# Patient Record
Sex: FEMALE | Race: WHITE | NOT HISPANIC OR LATINO | Employment: FULL TIME | URBAN - METROPOLITAN AREA
[De-identification: names, ages, dates, MRNs, and addresses within clinical notes are randomized per-mention and may not be internally consistent; named-entity substitution may affect disease eponyms.]

---

## 2017-01-18 ENCOUNTER — OFFICE VISIT (OUTPATIENT)
Dept: BARIATRICS/WEIGHT MGMT | Facility: CLINIC | Age: 37
End: 2017-01-18

## 2017-01-18 VITALS
WEIGHT: 224 LBS | RESPIRATION RATE: 16 BRPM | DIASTOLIC BLOOD PRESSURE: 71 MMHG | SYSTOLIC BLOOD PRESSURE: 130 MMHG | BODY MASS INDEX: 33.95 KG/M2 | TEMPERATURE: 98.7 F | HEART RATE: 69 BPM | HEIGHT: 68 IN

## 2017-01-18 DIAGNOSIS — Z72.4 INAPPROPRIATE DIET AND EATING HABITS: ICD-10-CM

## 2017-01-18 DIAGNOSIS — R63.5 UNINTENDED WEIGHT GAIN: ICD-10-CM

## 2017-01-18 DIAGNOSIS — R63.2 POLYPHAGIA(783.6): ICD-10-CM

## 2017-01-18 DIAGNOSIS — E66.9 OBESITY (BMI 30-39.9): Primary | ICD-10-CM

## 2017-01-18 DIAGNOSIS — Z71.3 DIETARY COUNSELING: ICD-10-CM

## 2017-01-18 PROCEDURE — S2083 ADJUSTMENT GASTRIC BAND: HCPCS | Performed by: NURSE PRACTITIONER

## 2017-01-18 NOTE — MR AVS SNAPSHOT
Rosa Avila   1/18/2017 4:30 PM   Office Visit    Dept Phone:  [phone]   Encounter #:  [id number]    Provider:  AFUA Manrique   Department:  [facility]                Your Full Care Plan              Your Updated Medication List          This list is accurate as of: 1/18/17  4:58 PM.  Always use your most recent med list.                buPROPion  MG 12 hr tablet   Commonly known as:  WELLBUTRIN SR       traZODone 50 MG tablet   Commonly known as:  DESYREL               You Were Diagnosed With        Codes Comments    Obesity (BMI 30-39.9)    -  Primary ICD-10-CM: E66.9  ICD-9-CM: 278.00     Dietary counseling     ICD-10-CM: Z71.3  ICD-9-CM: V65.3     Polyphagia     ICD-10-CM: R63.2  ICD-9-CM: 783.6     Unintended weight gain     ICD-10-CM: R63.5  ICD-9-CM: 783.1     Inappropriate diet and eating habits     ICD-10-CM: Z72.4  ICD-9-CM: V69.1       Instructions    Patient to return to clinic for nausea, vomiting, dysphagia, regurgitation or heartburn/reflux. Reviewed tight band symptoms with patient. Encouraged them to increase their lean protein and decrease their carbohydrates. Be sure to drink plenty of water daily and eliminate any liquid calories. Recommended 150 minutes of exercise per week including both cardio and strength training.        Patient Instructions History      Upcoming Appointments     Visit Type Date Time Department    FOLLOW UP 1/18/2017  4:30 PM [facility]    FOLLOW UP 3/31/2017  3:00 PM [facility]      [employer] Signup     [facility] [employer] allows you to send messages to your doctor, view your test results, renew your prescriptions, schedule appointments, and more. To sign up, go to News in [employer]orts and click on the Sign Up Now link in the New User? box. Enter your [employer] Activation Code exactly as it appears below along with the last four digits of your Social Security Number and your  "Date of Birth () to complete the sign-up process. If you do not sign up before the expiration date, you must request a new code.    [employer] Activation Code: [email or web address]  Expires: 2017  4:58 PM    If you have questions, you can email [email or web address] or call [phone] to talk to our [employer] staff. Remember, [employer] is NOT to be used for urgent needs. For medical emergencies, dial 911.               Other Info from Your Visit           Your Appointments     Mar 31, 2017  3:00 PM EDT   Follow Up with AFUA Manrique   [facility] BARIATRIC SURGERY (--)    [address] [place] [address]. [address]  [facility] [address] [phone]           Arrive 15 minutes prior to appointment.              Allergies     Hydrocodone-acetaminophen      Tramadol        Reason for Visit     Follow-up s/p lapband      Vital Signs     Blood Pressure Pulse Temperature Respirations Height Weight    130/71 69 98.7 °F (37.1 °C) (Oral) 16 68\" (172.7 cm) 224 lb (102 kg)    Body Mass Index Smoking Status                34.06 kg/m2 Former Smoker          Problems and Diagnoses Noted     Dietary counseling    Inappropriate diet and eating habits    Obesity (BMI 30-39.9)    Excessive eating    Unintended weight gain      No Longer an Issue     Overweight    Regurgitation        "

## 2017-01-18 NOTE — PROGRESS NOTES
[facility] BARIATRIC SURGERY  [address]  [facility] [address]  [address]  [facility] [address]  Dept: [phone]  1/18/2017      Rosa Avila.  [id number]  [id number]  1980  female      Chief Complaint   Patient presents with   • Follow-up     s/p lapband       BH Post-Op Bariatric Surgery:   Roas Avila is status post Lapband procedure (APS), performed on 12/17/10.     HPI:   Today's weight is 224 lb (102 kg)  pounds, today's BMI is Body mass index is 34.06 kg/(m^2). and she has a gain of 55 pounds since the last visit. The patient reports a portion size larger than the small plate, increased hunger and denies a loss of appetite.     Rosa Avila denies nausea, dysphagia, or abdominal pain. The patientdoes not have vomitng. The patientdoes not have reflux.    Diet and Exercise: Diet history reviewed and discussed with the patient. Weight loss/gains to date discussed with the patient. She reports eating 3 meals per day, a typical portion size of greater than 1 cup, eating 2 snack per day, drinking 2 8-oz. glasses of water per day. The patient can tolerate solid protein.   The patient is not eating protein first. The patient is not limiting food volume. The patient is not taking vitamins. The patient is limiting snacking. The patient is exercising regularly- walking. She is drinking carbonated beverages- regular soda.     The following portions of the patient's history were reviewed and updated as appropriate: allergies, current medications, past family history, past medical history, past social history, past surgical history and problem list.    Vitals:    01/18/17 1616   BP: 130/71   Pulse: 69   Resp: 16   Temp: 98.7 °F (37.1 °C)       Review of Systems   Endocrine: Positive for polyphagia.   All other systems reviewed and are negative.      Physical Exam   Constitutional: She is oriented to person, place, and time. She appears  well-developed and well-nourished.   HENT:   Head: Normocephalic and atraumatic.   Eyes: EOM are normal.   Cardiovascular: Normal rate.    Pulmonary/Chest: Effort normal.   Abdominal: Soft.   Musculoskeletal: Normal range of motion.   Neurological: She is alert and oriented to person, place, and time.   Skin: Skin is warm and dry.   Psychiatric: She has a normal mood and affect. Her behavior is normal. Judgment and thought content normal.   Vitals reviewed.      Assessment: Post-operatively the patient has regressed    Plan:     I think she would benefit from additional band restriction.  Under aseptic conditions, I accessed the port and 1.4mls of fluid were added for a total of 5.6mls.  She was able to tolerate a glass of water at the conclusion of the fill.  She was advised to consume soft solids for 24 hours before advancing back to a regular diet.  RTC for n/v/d/regurg.     We discussed her protein sources and that eating dense solid protein along with plenty of vegetables and fresh fruits is important for weight loss. Reviewed appropriate water intake- half of body weight in ounces and exercise routine- minimum of 150 minutes per with including both cardio and strength training. Instructed not to drink with meals and wait 45 minutes after each meal before drinking.    She should follow-up in 6-8 weeks       Activity restrictions: None.   Instructions / Recommendations: dietary counseling recommended, recommended a daily protein intake of  grams, patient was advised that the lap band system works best when consuming solid foods, vitamin supplement(s) recommended, recommended exercising at least 150 minutes per week, behavior modifications recommended and instructed to call the office for concerns, questions, or problems.     The patient was counseled regarding. Total time spent face to face was 10 minutes and more than half the time was spent counseling.

## 2017-01-18 NOTE — PATIENT INSTRUCTIONS
Patient to return to clinic for nausea, vomiting, dysphagia, regurgitation or heartburn/reflux. Reviewed tight band symptoms with patient. Encouraged them to increase their lean protein and decrease their carbohydrates. Be sure to drink plenty of water daily and eliminate any liquid calories. Recommended 150 minutes of exercise per week including both cardio and strength training.

## 2017-04-05 ENCOUNTER — OFFICE VISIT (OUTPATIENT)
Dept: BARIATRICS/WEIGHT MGMT | Facility: CLINIC | Age: 37
End: 2017-04-05

## 2017-04-05 VITALS
RESPIRATION RATE: 16 BRPM | DIASTOLIC BLOOD PRESSURE: 79 MMHG | HEART RATE: 61 BPM | HEIGHT: 68 IN | WEIGHT: 218 LBS | TEMPERATURE: 98.5 F | SYSTOLIC BLOOD PRESSURE: 135 MMHG | BODY MASS INDEX: 33.04 KG/M2

## 2017-04-05 DIAGNOSIS — R63.2 POLYPHAGIA(783.6): ICD-10-CM

## 2017-04-05 DIAGNOSIS — Z71.3 DIETARY COUNSELING: ICD-10-CM

## 2017-04-05 DIAGNOSIS — I10 BENIGN ESSENTIAL HYPERTENSION: ICD-10-CM

## 2017-04-05 DIAGNOSIS — Z72.4 INAPPROPRIATE DIET AND EATING HABITS: ICD-10-CM

## 2017-04-05 DIAGNOSIS — E66.9 OBESITY (BMI 30-39.9): Primary | ICD-10-CM

## 2017-04-05 DIAGNOSIS — R53.83 FATIGUE, UNSPECIFIED TYPE: ICD-10-CM

## 2017-04-05 PROBLEM — R63.5 UNINTENDED WEIGHT GAIN: Status: RESOLVED | Noted: 2017-01-18 | Resolved: 2017-04-05

## 2017-04-05 PROCEDURE — S2083 ADJUSTMENT GASTRIC BAND: HCPCS | Performed by: NURSE PRACTITIONER

## 2017-04-05 NOTE — PROGRESS NOTES
[facility] BARIATRIC SURGERY  [address]  [facility] [id number] [phone]  [address]  [facility] [address] [phone]  Dept: [phone]  4/5/2017      Rosa Avila.  [id number]  [id number]  1980  female      Chief Complaint   Patient presents with   • Follow-up     s/p lapband       BH Post-Op Bariatric Surgery:   Rosa Avila is status post Lapband procedure (APS), performed on 12/17/10.     HPI:   Today's weight is 218 lb (98.9 kg)  pounds, today's BMI is Body mass index is 33.15 kg/(m^2). and she has a loss of 6 pounds since the last visit. The patient reports a portion size larger than the small plate, increased hunger and denies a loss of appetite.     Rosa Avila denies nausea, dysphagia, or abdominal pain. The patientdoes not have vomitng. The patientdoes not have reflux.    Diet and Exercise: Diet history reviewed and discussed with the patient. Weight loss/gains to date discussed with the patient. She reports eating 3 meals per day, a typical portion size of greater than 1 cup, eating 2 snack per day, drinking 3 8-oz. glasses of water per day. The patient can tolerate solid protein.   The patient is eating protein first. The patient is limiting food volume. The patient is not taking vitamins. The patient is limiting snacking. The patient is exercising regularly- walking three times a week. She is drinking carbonated beverages- regular soda but cutting back.     The following portions of the patient's history were reviewed and updated as appropriate: allergies, current medications, past family history, past medical history, past social history, past surgical history and problem list.    Vitals:    04/05/17 1019   BP: 135/79   Pulse: 61   Resp: 16   Temp: 98.5 °F (36.9 °C)       Review of Systems   Endocrine: Positive for polyphagia.   All other systems reviewed and are negative.      Physical Exam   Constitutional:  She is oriented to person, place, and time. She appears well-developed and well-nourished.   HENT:   Head: Normocephalic and atraumatic.   Eyes: EOM are normal.   Cardiovascular: Normal rate.    Pulmonary/Chest: Effort normal.   Abdominal: Soft.   Musculoskeletal: Normal range of motion.   Neurological: She is alert and oriented to person, place, and time.   Skin: Skin is warm and dry.   Psychiatric: She has a normal mood and affect. Her behavior is normal. Judgment and thought content normal.   Vitals reviewed.      Assessment: Post-operatively the patient is doing better but would like an adjustment    Plan:     I think she would benefit from additional band restriction.  Under aseptic conditions, I accessed the port and 0.8mls of fluid were added for a total of 6.4mls.  She was able to tolerate a glass of water at the conclusion of the fill.  She was advised to consume soft solids for 24 hours before advancing back to a regular diet.  RTC for n/v/d/regurg.     We discussed her protein sources and that eating dense solid protein along with plenty of vegetables and fresh fruits is important for weight loss. Reviewed appropriate water intake- half of body weight in ounces and exercise routine- minimum of 150 minutes per with including both cardio and strength training. Instructed not to drink with meals and wait 45 minutes after each meal before drinking.    She should follow-up in 6 weeks       Activity restrictions: None.   Instructions / Recommendations: dietary counseling recommended, recommended a daily protein intake of  grams, patient was advised that the lap band system works best when consuming solid foods, vitamin supplement(s) recommended, recommended exercising at least 150 minutes per week, behavior modifications recommended and instructed to call the office for concerns, questions, or problems.     The patient was counseled regarding. Total time spent face to face was 12 minutes and more than  half the time was spent counseling.

## 2017-04-06 ENCOUNTER — OFFICE VISIT (OUTPATIENT)
Dept: BARIATRICS/WEIGHT MGMT | Facility: CLINIC | Age: 37
End: 2017-04-06

## 2017-04-06 VITALS
SYSTOLIC BLOOD PRESSURE: 120 MMHG | HEIGHT: 68 IN | DIASTOLIC BLOOD PRESSURE: 82 MMHG | RESPIRATION RATE: 16 BRPM | WEIGHT: 213 LBS | BODY MASS INDEX: 32.28 KG/M2

## 2017-04-06 DIAGNOSIS — I10 BENIGN ESSENTIAL HYPERTENSION: ICD-10-CM

## 2017-04-06 DIAGNOSIS — E66.9 OBESITY (BMI 30-39.9): Primary | ICD-10-CM

## 2017-04-06 DIAGNOSIS — R13.10 DYSPHAGIA, UNSPECIFIED TYPE: ICD-10-CM

## 2017-04-06 DIAGNOSIS — IMO0001 REGURGITATION: ICD-10-CM

## 2017-04-06 DIAGNOSIS — Z71.3 DIETARY COUNSELING: ICD-10-CM

## 2017-04-06 PROCEDURE — S2083 ADJUSTMENT GASTRIC BAND: HCPCS | Performed by: NURSE PRACTITIONER

## 2017-04-06 NOTE — PROGRESS NOTES
[facility] BARIATRIC SURGERY  [address]  [facility] [id number] [phone]  [address]  [facility] [address] [phone]  Dept: [phone]  4/6/2017      Rosa Avila.  [id number]  [id number]  1980  female      Chief Complaint   Patient presents with   • Follow-up     s/p lapband c/o being too tight       BH Post-Op Bariatric Surgery:   Rosa Avila is status post Lapband procedure (APS), performed on 12/17/10.     HPI:   Today's weight is 213 lb (96.6 kg)  pounds, today's BMI is Body mass index is 32.39 kg/(m^2). and she has a loss of 5 pounds since the last visit. The patient reports decreased hunger and  loss of appetite.     Patient admits to nausea and dysphagia. The patient denies abdominal pain. The patientdoes not have vomitng. The patientdoes not have reflux.    Diet and Exercise: Diet history reviewed and discussed with the patient. Weight loss/gains to date discussed with the patient. The patient cannot tolerate solid protein.   The patient is not eating protein first. The patient is limiting food volume.  The patient is exercising regularly. She is not drinking carbonated beverages.     The following portions of the patient's history were reviewed and updated as appropriate: allergies, current medications, past family history, past medical history, past social history, past surgical history and problem list.    Vitals:    04/06/17 1400   BP: 120/82   Resp: 16       Review of Systems   Gastrointestinal: Positive for nausea and vomiting.   All other systems reviewed and are negative.      Physical Exam   Constitutional: She is oriented to person, place, and time. She appears well-developed and well-nourished.   HENT:   Head: Normocephalic and atraumatic.   Eyes: EOM are normal.   Cardiovascular: Normal rate.    Pulmonary/Chest: Effort normal.   Abdominal: Soft.   Musculoskeletal: Normal range of motion.   Neurological:  She is alert and oriented to person, place, and time.   Skin: Skin is warm and dry.   Psychiatric: She has a normal mood and affect. Her behavior is normal. Judgment and thought content normal.   Vitals reviewed.      Assessment: Post-operatively the patient is too tight and needs fluid removed    Plan:    My impression is Rosa Avila has too much restriction of her band.  I think she would benefit from fluid removal from her band.  Under aseptic conditions, I accessed the port and removed 0.4cc to bring the total band volume to 6cc.  She was able to tolerate a glass of water at the conclusion of the fill.  She was advised to consume warm liquids for today and then soft solids for 24 hours before advancing back to a regular diet.   RTC for n/v/d/regurg.     Reviewed the importance of being able to tolerate dense/solid protein and vegetables for weight loss. Discussed eating foods that are easier to tolerate, softer foods, usually contribute to weight gain because they are higher calorie/carb and will not keep patient full for the recommended 3-4 hours.      She should follow-up in 6 weeks.      Activity restrictions: None.   Instructions / Recommendations: dietary counseling recommended, recommended a daily protein intake of  grams, patient was advised that the lap band system works best when consuming solid foods, vitamin supplement(s) recommended, recommended exercising at least 150 minutes per week, behavior modifications recommended and instructed to call the office for concerns, questions, or problems.     The patient was counseled regarding. Total time of spent face to face was 10 minutes and more than half the time was spent counseling.

## 2018-01-11 ENCOUNTER — OFFICE VISIT (OUTPATIENT)
Dept: BARIATRICS/WEIGHT MGMT | Facility: CLINIC | Age: 38
End: 2018-01-11

## 2018-01-11 VITALS
HEART RATE: 65 BPM | BODY MASS INDEX: 31.07 KG/M2 | TEMPERATURE: 97.4 F | DIASTOLIC BLOOD PRESSURE: 88 MMHG | HEIGHT: 68 IN | SYSTOLIC BLOOD PRESSURE: 155 MMHG | RESPIRATION RATE: 16 BRPM | WEIGHT: 205 LBS

## 2018-01-11 DIAGNOSIS — R63.2 POLYPHAGIA(783.6): ICD-10-CM

## 2018-01-11 DIAGNOSIS — I10 BENIGN ESSENTIAL HYPERTENSION: ICD-10-CM

## 2018-01-11 DIAGNOSIS — Z72.4 INAPPROPRIATE DIET AND EATING HABITS: ICD-10-CM

## 2018-01-11 DIAGNOSIS — E66.9 OBESITY (BMI 30-39.9): Primary | ICD-10-CM

## 2018-01-11 DIAGNOSIS — Z71.3 DIETARY COUNSELING: ICD-10-CM

## 2018-01-11 PROBLEM — R13.10 DYSPHAGIA: Status: RESOLVED | Noted: 2017-04-06 | Resolved: 2018-01-11

## 2018-01-11 PROCEDURE — S2083 ADJUSTMENT GASTRIC BAND: HCPCS | Performed by: NURSE PRACTITIONER

## 2018-01-11 NOTE — PROGRESS NOTES
[facility] BARIATRIC SURGERY  [address]  [facility] [address] [phone]  [address]  [facility] [address] [phone]  Dept: [phone]  1/11/2018      Rosa Avila.  [id number]  [id number]  1980  female      Chief Complaint   Patient presents with   • Follow-up     Followup AGB (6 mL)       BH Post-Op Bariatric Surgery:   Rosa Avila is status post Lapband procedure (APS), performed on 12/17/10.     HPI:   Today's weight is 93 kg (205 lb)  pounds, today's BMI is Body mass index is 31.18 kg/(m^2). and she has a loss of 8 pounds since the last visit. The patient reports a portion size larger than the small plate, increased hunger and denies a loss of appetite.     Rosa Avila denies nausea, dysphagia, or abdominal pain. The patientdoes not have vomitng. The patientdoes not have reflux.    Diet and Exercise: Diet history reviewed and discussed with the patient. Weight loss/gains to date discussed with the patient. She reports eating 4 meals per day, a typical portion size of greater than 1 cup, eating 1 snack per day, drinking 2 8-oz. glasses of water per day. The patient can tolerate solid protein.   The patient is eating protein first. The patient is limiting food volume. The patient is not taking vitamins. The patient is limiting snacking. The patient is exercising regularly- walking a couple days per week. She is drinking carbonated beverages.     The following portions of the patient's history were reviewed and updated as appropriate: allergies, current medications, past family history, past medical history, past social history, past surgical history and problem list.    Vitals:    01/11/18 1446   BP: 155/88   Pulse: 65   Resp: 16   Temp: 97.4 °F (36.3 °C)       Review of Systems   Endocrine: Positive for polyphagia.   All other systems reviewed and are negative.      Physical Exam   Constitutional: She is oriented to  person, place, and time. She appears well-developed and well-nourished.   HENT:   Head: Normocephalic and atraumatic.   Eyes: EOM are normal.   Cardiovascular: Normal rate.    Pulmonary/Chest: Effort normal.   Abdominal: Soft.   Musculoskeletal: Normal range of motion.   Neurological: She is alert and oriented to person, place, and time.   Skin: Skin is warm and dry.   Psychiatric: She has a normal mood and affect. Her behavior is normal. Judgment and thought content normal.   Vitals reviewed.        Assessment: Post-operatively the patient is doing well but would like an adjustment    Encounter Diagnoses   Name Primary?   • Obesity (BMI 30-39.9) Yes   • Polyphagia(783.6)    • Dietary counseling    • Benign essential hypertension    • Inappropriate diet and eating habits        Plan:     I think she would benefit from additional band restriction.  Under aseptic conditions, I accessed the port and 0.3mls of fluid were added for a total of 6.3mls.  She was able to tolerate a glass of water at the conclusion of the fill.  She was advised to consume soft solids for 24 hours before advancing back to a regular diet.  RTC for n/v/d/regurg.     We discussed her protein sources and that eating dense solid protein along with plenty of vegetables and fresh fruits is important for weight loss. Reviewed appropriate water intake- half of body weight in ounces and exercise routine- minimum of 150 minutes per with including both cardio and strength training. Instructed not to drink with meals and wait 45 minutes after each meal before drinking.    She should follow-up in 6-8 weeks       Activity restrictions: None.   Instructions / Recommendations: dietary counseling recommended, recommended a daily protein intake of  grams, patient was advised that the lap band system works best when consuming solid foods, vitamin supplement(s) recommended, recommended exercising at least 150 minutes per week, behavior modifications  recommended and instructed to call the office for concerns, questions, or problems.

## 2019-11-18 ENCOUNTER — OFFICE VISIT CONVERTED (OUTPATIENT)
Dept: SURGERY | Facility: CLINIC | Age: 39
End: 2019-11-18
Attending: SURGERY

## 2019-11-27 ENCOUNTER — HOSPITAL ENCOUNTER (OUTPATIENT)
Dept: ULTRASOUND IMAGING | Facility: HOSPITAL | Age: 39
Discharge: HOME | End: 2019-11-27
Attending: SURGERY

## 2019-12-09 ENCOUNTER — CONVERSION ENCOUNTER (OUTPATIENT)
Dept: SURGERY | Facility: CLINIC | Age: 39
End: 2019-12-09

## 2019-12-09 ENCOUNTER — OFFICE VISIT CONVERTED (OUTPATIENT)
Dept: SURGERY | Facility: CLINIC | Age: 39
End: 2019-12-09
Attending: SURGERY

## 2019-12-27 ENCOUNTER — HOSPITAL ENCOUNTER (OUTPATIENT)
Dept: GASTROENTEROLOGY | Facility: HOSPITAL | Age: 39
Setting detail: HOSPITAL OUTPATIENT SURGERY
Discharge: HOME | End: 2019-12-27
Attending: SURGERY

## 2021-05-03 ENCOUNTER — HOSPITAL ENCOUNTER (OUTPATIENT)
Dept: MAMMOGRAPHY | Facility: HOSPITAL | Age: 41
Discharge: HOME | End: 2021-05-03
Attending: INTERNAL MEDICINE

## 2021-05-15 VITALS — HEIGHT: 67 IN | RESPIRATION RATE: 14 BRPM | BODY MASS INDEX: 28.25 KG/M2 | WEIGHT: 180 LBS

## 2021-05-15 VITALS — HEIGHT: 67 IN | BODY MASS INDEX: 28.25 KG/M2 | WEIGHT: 180 LBS | RESPIRATION RATE: 14 BRPM

## 2021-07-21 ENCOUNTER — OFFICE VISIT (OUTPATIENT)
Dept: BARIATRICS/WEIGHT MGMT | Facility: CLINIC | Age: 41
End: 2021-07-21

## 2021-07-21 VITALS
WEIGHT: 200 LBS | SYSTOLIC BLOOD PRESSURE: 132 MMHG | BODY MASS INDEX: 31.39 KG/M2 | HEART RATE: 63 BPM | DIASTOLIC BLOOD PRESSURE: 82 MMHG | RESPIRATION RATE: 18 BRPM | HEIGHT: 67 IN | TEMPERATURE: 98 F

## 2021-07-21 DIAGNOSIS — R63.2 POLYPHAGIA(783.6): ICD-10-CM

## 2021-07-21 DIAGNOSIS — E66.9 OBESITY, CLASS I, BMI 30-34.9: Primary | ICD-10-CM

## 2021-07-21 DIAGNOSIS — Z98.84 H/O LAPAROSCOPIC ADJUSTABLE GASTRIC BANDING: ICD-10-CM

## 2021-07-21 PROBLEM — K21.9 ESOPHAGEAL REFLUX: Status: ACTIVE | Noted: 2021-07-21

## 2021-07-21 PROBLEM — E66.811 OBESITY, CLASS I, BMI 30-34.9: Status: ACTIVE | Noted: 2021-07-21

## 2021-07-21 PROCEDURE — 99203 OFFICE O/P NEW LOW 30 MIN: CPT | Performed by: NURSE PRACTITIONER

## 2021-07-21 RX ORDER — GABAPENTIN 600 MG/1
1200 TABLET ORAL
COMMUNITY
Start: 2021-07-16

## 2021-07-21 NOTE — PROGRESS NOTES
[facility] BARIATRIC SURGERY  [address] [address]  [phone]  [address] [address] [phone]  Dept: [phone]  7/21/2021      Rosa Avila.  [id number]  [id number]  1980  female      Chief Complaint   Patient presents with   • Follow-up     BAND FOLLOW UP       BH Post-Op Bariatric Surgery:   Rosa Avila is status post Lapband procedure APS, performed on 12/17/10.     HPI:   Today's weight is 90.7 kg (200 lb)  pounds, today's BMI is Body mass index is 31.32 kg/m². and has a loss of 5 pounds since the last visit. The patient reports a portion size larger than the small plate, increased hunger and denies a loss of appetite.     Rosa Avila denies nausea, dysphagia, or abdominal pain. The patientdoes not have vomitng. The patientdoes not have reflux.    Diet and Exercise: Diet history reviewed and discussed with the patient. Weight loss/gains to date discussed with the patient. She reports eating 2 meals per day, a typical portion size of greater than 1 cup, eating 2 snack per day, drinking 2 8-oz. glasses of water per day. The patient can tolerate solid protein.   The patient is eating protein first. The patient is limiting food volume. The patient is taking vitamins. The patient is limiting snacking. The patient is not exercising regularly. She is drinking carbonated beverages.     The following portions of the patient's history were reviewed and updated as appropriate: allergies, current medications, past family history, past medical history, past social history, past surgical history and problem list.    Vitals:    07/21/21 1438   BP: 132/82   Pulse: 63   Resp: 18   Temp: 98 °F (36.7 °C)       Review of Systems   Endocrine: Positive for polyphagia.   All other systems reviewed and are negative.      Physical Exam  Vitals reviewed.   Constitutional:       Appearance: Normal appearance. She is  well-developed. She is obese.   HENT:      Head: Normocephalic and atraumatic.   Cardiovascular:      Rate and Rhythm: Normal rate.   Pulmonary:      Effort: Pulmonary effort is normal.   Abdominal:      Palpations: Abdomen is soft.   Musculoskeletal:         General: Normal range of motion.   Skin:     General: Skin is warm and dry.   Neurological:      Mental Status: She is alert and oriented to person, place, and time.   Psychiatric:         Behavior: Behavior normal.         Thought Content: Thought content normal.         Judgment: Judgment normal.         Assessment: Post-operatively the patient would benefit from additional restriction    Encounter Diagnoses   Name Primary?   • Obesity, Class I, BMI 30-34.9 Yes   • H/O laparoscopic adjustable gastric banding    • Polyphagia(783.6)        Plan:     I think she would benefit from additional band restriction.  Under aseptic conditions, I accessed the port and 0.3mls of fluid were added for a total of 6.6mls.  She was able to tolerate a glass of water at the conclusion of the fill.  She was advised to consume soft solids for 24 hours before advancing back to a regular diet.  RTC for n/v/d/regurg.     We discussed her protein sources and that eating dense solid protein along with plenty of vegetables and fresh fruits is important for weight loss. Reviewed appropriate water intake- half of body weight in ounces and exercise routine- minimum of 150 minutes per with including both cardio and strength training. Instructed not to drink with meals and wait 45 minutes after each meal before drinking.    She should follow-up in 6 weeks if needed     Activity restrictions: None.   Instructions / Recommendations: dietary counseling recommended, recommended a daily protein intake of  grams, patient was advised that the lap band system works best when consuming solid foods, vitamin supplement(s) recommended, recommended exercising at least 150 minutes per week, behavior  modifications recommended and instructed to call the office for concerns, questions, or problems.    Chart was also reviewed prior to starting the visit. The patient was counseled regarding the LAP-BAND and how the band works.

## 2022-11-23 ENCOUNTER — OFFICE VISIT (OUTPATIENT)
Dept: OBSTETRICS AND GYNECOLOGY | Facility: CLINIC | Age: 42
End: 2022-11-23

## 2022-11-23 VITALS
DIASTOLIC BLOOD PRESSURE: 81 MMHG | SYSTOLIC BLOOD PRESSURE: 137 MMHG | BODY MASS INDEX: 30.92 KG/M2 | HEART RATE: 63 BPM | HEIGHT: 67 IN | WEIGHT: 197 LBS

## 2022-11-23 DIAGNOSIS — Z01.419 ENCOUNTER FOR GYNECOLOGICAL EXAMINATION WITHOUT ABNORMAL FINDING: Primary | ICD-10-CM

## 2022-11-23 PROCEDURE — 99396 PREV VISIT EST AGE 40-64: CPT | Performed by: OBSTETRICS & GYNECOLOGY

## 2022-11-23 NOTE — PROGRESS NOTES
"Well Woman Visit    CC: Annual well woman exam       HPI:   42 y.o. Contraception or HRT: using no HRT, s/p HYST, still has one or both ovaries, benign indications, rso   Menses:  none  Pain:  occ left ovary pain with ovulation, tolerable   Incontinence concerns: No  Hx of abnormal pap:  No  Pt has no complaints today.      History: PMHx, Meds, Allergies, PSHx, Social Hx, and POBHx all reviewed and updated.      PHYSICAL EXAM:  /81   Pulse 63   Ht 170.2 cm (67.01\")   Wt 89.4 kg (197 lb)   BMI 30.85 kg/m²   General- NAD, alert and oriented, appropriate  Psych- Normal mood, good memory  Neck- No masses, no thyroid enlargement  CV- Regular rhythm, no murnurs  Resp- CTA to bases, no wheezes  Abdomen- Soft, non distended, non tender, no masses    Breast left-  Bilaterally symmetrical, no masses, non tender, no nipple discharge  Breast right- Bilaterally symmetrical, no masses, non tender, no nipple discharge    External genitalia- Normal female, no lesions  Urethra/meatus- Normal, no masses, non tender, no prolapse  Bladder- Normal, no masses, non tender, no prolapse  Vagina- Normal, no atrophy, no lesions, no discharge, no prolapse  Cvx- Absent  Uterus- Absent  Adnexa- No mass, non tender  Anus/Rectum/Perineum- Not performed    Lymphatic- No palpable neck, axillary, or groin nodes  Ext- No edema, no cyanosis    Skin- No lesions, no rashes, no acanthosis nigricans        ASSESSMENT and PLAN:  WWE    Diagnoses and all orders for this visit:    1. Encounter for gynecological examination without abnormal finding (Primary)  -     Mammo Screening Digital Tomosynthesis Bilateral With CAD; Future        Domestic violence/abuse screen: negative    Depression screen: no SI    Preventative:   BREAST HEALTH- Monthly self breast exam importance and how to reviewed. MMG and/or MRI (prn) reviewed per society guidelines and her individual history. Mammo/MRI screen: Updated today.  CERVICAL CANCER Screening- Reviewed " current ASCCP guidelines for screening w and wo cotest HPV, age specific.  Screen: Not medically needed.  COLON CANCER Screening- Reviewed current medical society guidelines and options.  Colonoscopy screen:  Not medically needed.  SEXUAL HEALTH: Declines STD screening.  VACCINATIONS Recommended: Flu annually.  Importance discussed, risk being unvaccinated reviewed.  Questions answered  Smoking status- NON SMOKER.  Importance of avoiding second hand smoke.  Follow up PCP/Specialist PMHx and Labs  Myriad: Does not qualify.      She understands the importance of having any ordered tests to be performed in a timely fashion.  She is encouraged to review her results online and/or contact or office if she has questions.     Follow Up:  Return if symptoms worsen or fail to improve.      Anjelica Benito, APRN  11/23/2022

## 2022-11-29 ENCOUNTER — HOSPITAL ENCOUNTER (OUTPATIENT)
Dept: MAMMOGRAPHY | Facility: HOSPITAL | Age: 42
Discharge: HOME | End: 2022-11-29
Admitting: OBSTETRICS & GYNECOLOGY

## 2022-11-29 DIAGNOSIS — Z01.419 ENCOUNTER FOR GYNECOLOGICAL EXAMINATION WITHOUT ABNORMAL FINDING: ICD-10-CM

## 2022-11-29 PROCEDURE — 77063 BREAST TOMOSYNTHESIS BI: CPT

## 2022-11-29 PROCEDURE — 77067 SCR MAMMO BI INCL CAD: CPT

## 2023-12-06 ENCOUNTER — TELEPHONE (OUTPATIENT)
Dept: OBSTETRICS AND GYNECOLOGY | Facility: CLINIC | Age: 43
End: 2023-12-06

## 2023-12-06 ENCOUNTER — TRANSCRIBE ORDERS (OUTPATIENT)
Dept: ADMINISTRATIVE | Facility: HOSPITAL | Age: 43
End: 2023-12-06
Payer: COMMERCIAL

## 2023-12-06 DIAGNOSIS — Z12.31 BREAST CANCER SCREENING BY MAMMOGRAM: Primary | ICD-10-CM

## 2023-12-06 NOTE — TELEPHONE ENCOUNTER
Caller: Rosa Avila    Relationship: Self    Best call back number: [phone]    What orders are you requesting (i.e. lab or imaging): SCREENING MAMMOGRAM    In what timeframe would the patient need to come in: ASAP    Where will you receive your lab/imaging services:

## 2023-12-11 ENCOUNTER — OFFICE VISIT (OUTPATIENT)
Dept: OBSTETRICS AND GYNECOLOGY | Facility: CLINIC | Age: 43
End: 2023-12-11
Payer: COMMERCIAL

## 2023-12-11 VITALS
SYSTOLIC BLOOD PRESSURE: 134 MMHG | HEART RATE: 70 BPM | DIASTOLIC BLOOD PRESSURE: 74 MMHG | WEIGHT: 194.2 LBS | BODY MASS INDEX: 30.48 KG/M2 | HEIGHT: 67 IN

## 2023-12-11 DIAGNOSIS — Z87.898 HISTORY OF ABNORMAL MAMMOGRAM: ICD-10-CM

## 2023-12-11 DIAGNOSIS — Z01.419 ENCOUNTER FOR GYNECOLOGICAL EXAMINATION WITHOUT ABNORMAL FINDING: Primary | ICD-10-CM

## 2023-12-11 PROCEDURE — 99396 PREV VISIT EST AGE 40-64: CPT | Performed by: OBSTETRICS & GYNECOLOGY

## 2023-12-11 NOTE — PROGRESS NOTES
"Well Woman Visit    CC: Annual well woman exam       HPI:   43 y.o. Contraception or HRT: s/p HYST, still has one or both ovaries, benign indications, rso  Menses:  none  Pain:  None  Incontinence concerns: No  Hx of abnormal pap:  No  Pt has no complaints today. Wants to discuss possible breast mri. States had an abnormal mammo and then biopsy and they have had several women at her place of employment develop breast cancer. They have tested the water and other things to r/o environmental cause but it has been negative.      History: PMHx, Meds, Allergies, PSHx, Social Hx, and POBHx all reviewed and updated.      PHYSICAL EXAM:  /74   Pulse 70   Ht 170.2 cm (67.01\")   Wt 88.1 kg (194 lb 3.2 oz)   BMI 30.41 kg/m²   General- NAD, alert and oriented, appropriate  Psych- Normal mood, good memory  Neck- No masses, no thyroid enlargement  CV- Regular rhythm, no murnurs  Resp- CTA to bases, no wheezes  Abdomen- Soft, non distended, non tender, no masses    Breast left-  Bilaterally symmetrical, no masses, non tender, no nipple discharge  Breast right- Bilaterally symmetrical, no masses, non tender, no nipple discharge    External genitalia- Normal female, no lesions  Urethra/meatus- Normal, no masses, non tender, no prolapse  Bladder- Normal, no masses, non tender, no prolapse  Vagina- Normal, no atrophy, no lesions, no discharge, no prolapse  Cvx- Absent  Uterus- Absent  Adnexa- No mass, non tender  Anus/Rectum/Perineum- Not performed    Lymphatic- No palpable neck, axillary, or groin nodes  Ext- No edema, no cyanosis    Skin- No lesions, no rashes, no acanthosis nigricans        ASSESSMENT and PLAN:  WWE    Diagnoses and all orders for this visit:    1. Encounter for gynecological examination without abnormal finding (Primary)    2. History of abnormal mammogram  -     MRI Breast Bilateral With & Without Contrast; Future        Domestic violence/abuse screen: negative    Depression screen: no " SI    Preventative:   BREAST HEALTH- Monthly self breast exam importance and how to reviewed. MMG and/or MRI (prn) reviewed per society guidelines and her individual history. Mammo/MRI screen: Updated today.  CERVICAL CANCER Screening- Reviewed current ASCCP guidelines for screening w and wo cotest HPV, age specific.  Screen: Not medically needed.  COLON CANCER Screening- Reviewed current medical society guidelines and options.  Colonoscopy screen:  Not medically needed.  SEXUAL HEALTH: Declines STD screening.  VACCINATIONS Recommended: Flu annually.  Importance discussed, risk being unvaccinated reviewed.  Questions answered  Smoking status- NON SMOKER.  Importance of avoiding second hand smoke.  Follow up PCP/Specialist PMHx and Labs  Myriad : does not qualify      She understands the importance of having any ordered tests to be performed in a timely fashion.  She is encouraged to review her results online and/or contact or office if she has questions.     Follow Up:  Return in about 1 year (around 12/11/2024) for Annual physical.      Anjelica Benito, APRN  12/11/2023

## 2023-12-21 ENCOUNTER — HOSPITAL ENCOUNTER (OUTPATIENT)
Dept: MAMMOGRAPHY | Facility: HOSPITAL | Age: 43
Discharge: HOME | End: 2023-12-21
Admitting: OBSTETRICS & GYNECOLOGY
Payer: COMMERCIAL

## 2023-12-21 DIAGNOSIS — Z12.31 BREAST CANCER SCREENING BY MAMMOGRAM: ICD-10-CM

## 2023-12-21 PROCEDURE — 77063 BREAST TOMOSYNTHESIS BI: CPT

## 2023-12-21 PROCEDURE — 77067 SCR MAMMO BI INCL CAD: CPT

## 2024-09-13 ENCOUNTER — TELEMEDICINE (OUTPATIENT)
Dept: BARIATRICS/WEIGHT MGMT | Facility: CLINIC | Age: 44
End: 2024-09-13
Payer: COMMERCIAL

## 2024-09-13 DIAGNOSIS — R13.10 DYSPHAGIA, UNSPECIFIED TYPE: Primary | ICD-10-CM

## 2024-10-10 ENCOUNTER — OFFICE VISIT (OUTPATIENT)
Dept: BARIATRICS/WEIGHT MGMT | Facility: CLINIC | Age: 44
End: 2024-10-10
Payer: COMMERCIAL

## 2024-10-10 VITALS
BODY MASS INDEX: 30.45 KG/M2 | SYSTOLIC BLOOD PRESSURE: 142 MMHG | WEIGHT: 194 LBS | DIASTOLIC BLOOD PRESSURE: 90 MMHG | HEIGHT: 67 IN | TEMPERATURE: 98.4 F

## 2024-10-10 DIAGNOSIS — Z98.84 H/O LAPAROSCOPIC ADJUSTABLE GASTRIC BANDING: ICD-10-CM

## 2024-10-10 DIAGNOSIS — R11.10 REGURGITATION OF FOOD: ICD-10-CM

## 2024-10-10 DIAGNOSIS — E66.811 OBESITY, CLASS I, BMI 30-34.9: Primary | ICD-10-CM

## 2024-10-10 DIAGNOSIS — K21.9 GASTROESOPHAGEAL REFLUX DISEASE, UNSPECIFIED WHETHER ESOPHAGITIS PRESENT: ICD-10-CM

## 2024-10-10 PROBLEM — F41.9 ANXIETY AND DEPRESSION: Status: ACTIVE | Noted: 2024-04-29

## 2024-10-10 PROBLEM — A77.0 ROCKY MOUNTAIN SPOTTED FEVER: Status: ACTIVE | Noted: 2024-10-10

## 2024-10-10 PROBLEM — Z72.4 INAPPROPRIATE DIET AND EATING HABITS: Status: RESOLVED | Noted: 2017-01-18 | Resolved: 2024-10-10

## 2024-10-10 PROBLEM — F32.A ANXIETY AND DEPRESSION: Status: ACTIVE | Noted: 2024-04-29

## 2024-10-10 RX ORDER — SUMATRIPTAN 50 MG/1
50 TABLET, FILM COATED ORAL
COMMUNITY
Start: 2023-07-10

## 2024-10-10 RX ORDER — PREGABALIN 75 MG/1
CAPSULE ORAL
COMMUNITY
Start: 2024-04-01

## 2024-10-10 RX ORDER — HYDROXYZINE HYDROCHLORIDE 25 MG/1
TABLET, FILM COATED ORAL
COMMUNITY
Start: 2024-04-01

## 2024-10-10 RX ORDER — DULOXETIN HYDROCHLORIDE 30 MG/1
CAPSULE, DELAYED RELEASE ORAL
COMMUNITY
Start: 2024-04-01

## 2024-10-10 NOTE — PROGRESS NOTES
[facility] BARIATRIC SURGERY  [address]  [facility] [id number] [phone]  [address]  [facility] [address] [phone]  Dept: [phone]  10/10/2024      Rosa Avila.  [id number]  [id number]  1980  female      Chief Complaint   Patient presents with    Follow-up     Fup band       BH Post-Op Bariatric Surgery:   Rosa Avila is status post Lapband procedure APS, performed on 12/17/10.     HPI:   Today's weight is 88 kg (194 lb)  pounds, today's BMI is Body mass index is 30.38 kg/m². and has a loss of 6 pounds since the last visit. The patient reports decreased hunger and  loss of appetite.     Patient admits to nausea and dysphagia. The patient denies abdominal pain. The patientdoes have vomitng. The patientdoes have reflux.    Diet and Exercise: Diet history reviewed and discussed with the patient. Weight loss/gains to date discussed with the patient. She reports eating 3 meals per day, a typical portion size of 1 cup, eating 2 snack per day, drinking 1+ 8-oz. glasses of water per day. The patient cannot tolerate solid protein.   The patient is not eating protein first. The patient is limiting food volume. The patient is not taking vitamins. The patient is limiting snacking. The patient is not exercising regularly. She is drinking carbonated beverages.     The following portions of the patient's history were reviewed and updated as appropriate: allergies, current medications, past family history, past medical history, past social history, past surgical history, and problem list.    Vitals:    10/10/24 1426   BP: 142/90   Temp: 98.4 °F (36.9 °C)       Review of Systems   Gastrointestinal:  Positive for vomiting.   All other systems reviewed and are negative.      Physical Exam  Vitals reviewed.   Constitutional:       Appearance: Normal appearance. She is well-developed.   HENT:      Head: Normocephalic and atraumatic.    Cardiovascular:      Rate and Rhythm: Normal rate.   Pulmonary:      Effort: Pulmonary effort is normal.   Abdominal:      Palpations: Abdomen is soft.   Musculoskeletal:         General: Normal range of motion.   Skin:     General: Skin is warm and dry.   Neurological:      Mental Status: She is alert and oriented to person, place, and time.   Psychiatric:         Behavior: Behavior normal.         Thought Content: Thought content normal.         Judgment: Judgment normal.         Assessment: Post-operatively the patient would benefit from removing fluid    Encounter Diagnoses   Name Primary?    Obesity, Class I, BMI 30-34.9 Yes    H/O laparoscopic adjustable gastric banding     Regurgitation of food     Gastroesophageal reflux disease, unspecified whether esophagitis present        Plan:    My impression is Rosa Avila has too much restriction of her band.  I think she would benefit from fluid removal from her band.  Under aseptic conditions, I accessed the port and removed 1cc to bring the total band volume to 5.6cc.  She was able to tolerate a glass of water at the conclusion of the fill.  She was advised to consume warm liquids for today and then soft solids for 24 hours before advancing back to a regular diet.   RTC for n/v/d/regurg.     Reviewed the importance of being able to tolerate dense/solid protein and vegetables for weight loss. Discussed eating foods that are easier to tolerate, softer foods, usually contribute to weight gain because they are higher calorie/carb and will not keep patient full for the recommended 3-4 hours.      She should follow-up after UGI.      UGI ordered    Activity restrictions: None.   Instructions / Recommendations: dietary counseling recommended, recommended a daily protein intake of  grams, patient was advised that the lap band system works best when consuming solid foods, vitamin supplement(s) recommended, recommended exercising at least 150 minutes per week,  behavior modifications recommended and instructed to call the office for concerns, questions, or problems.     Total time spent on this encounter was 45 minutes.  Chart was also reviewed prior to starting the visit. The patient was counseled regarding the LAP-BAND and how the band works.

## 2024-10-23 ENCOUNTER — TELEPHONE (OUTPATIENT)
Dept: BARIATRICS/WEIGHT MGMT | Facility: CLINIC | Age: 44
End: 2024-10-23
Payer: COMMERCIAL

## 2024-10-23 NOTE — TELEPHONE ENCOUNTER
LM for patient about scheduling UGI. Hospital scheduling has been unsuccessful and reaching the patient to get this study scheduled. Will send letter to the patient as well.

## 2024-11-11 ENCOUNTER — TRANSCRIBE ORDERS (OUTPATIENT)
Dept: ADMINISTRATIVE | Facility: HOSPITAL | Age: 44
End: 2024-11-11
Payer: COMMERCIAL

## 2024-11-11 DIAGNOSIS — Z30.09 SCREENING AND EVALUATION FOR FEMALE STERILIZATION: Primary | ICD-10-CM

## 2024-12-12 ENCOUNTER — OFFICE VISIT (OUTPATIENT)
Dept: BARIATRICS/WEIGHT MGMT | Facility: CLINIC | Age: 44
End: 2024-12-12
Payer: COMMERCIAL

## 2024-12-12 VITALS
WEIGHT: 235 LBS | BODY MASS INDEX: 36.88 KG/M2 | HEART RATE: 69 BPM | SYSTOLIC BLOOD PRESSURE: 150 MMHG | DIASTOLIC BLOOD PRESSURE: 78 MMHG | HEIGHT: 67 IN | TEMPERATURE: 97.5 F

## 2024-12-12 DIAGNOSIS — Z71.3 DIETARY COUNSELING: ICD-10-CM

## 2024-12-12 DIAGNOSIS — R63.2 POLYPHAGIA(783.6): ICD-10-CM

## 2024-12-12 DIAGNOSIS — Z98.84 H/O LAPAROSCOPIC ADJUSTABLE GASTRIC BANDING: ICD-10-CM

## 2024-12-12 DIAGNOSIS — E66.812 OBESITY, CLASS II, BMI 35-39.9: Primary | ICD-10-CM

## 2024-12-12 PROBLEM — E66.811 OBESITY, CLASS I, BMI 30-34.9: Status: RESOLVED | Noted: 2021-07-21 | Resolved: 2024-12-12

## 2024-12-12 PROCEDURE — 99214 OFFICE O/P EST MOD 30 MIN: CPT | Performed by: NURSE PRACTITIONER

## 2024-12-12 NOTE — PROGRESS NOTES
[facility] BARIATRIC SURGERY  [address]  [facility] [id number] [phone]  [address]  [facility] [address] [phone]  Dept: [phone]  12/12/2024      Rosa Avila.  [id number]  [id number]  1980  female      Chief Complaint   Patient presents with    Follow-up     Follow up band        BH Post-Op Bariatric Surgery:   Rosa Avila is status post Lapband procedure APS, performed on 12/17/10.     HPI:   Today's weight is 107 kg (235 lb)  pounds, today's BMI is Body mass index is 36.8 kg/m². and has a gain of 41 pounds since the last visit. The patient reports a portion size larger than the small plate, increased hunger and denies a loss of appetite.     Rosa Avila denies nausea, dysphagia, or abdominal pain. The patientdoes not have vomitng. The patientdoes not have reflux.    Diet and Exercise: Diet history reviewed and discussed with the patient. Weight loss/gains to date discussed with the patient. She reports eating 3 meals per day, a typical portion size of greater than 1 cup, eating 2 snack per day, drinking 3 8-oz. glasses of water per day. The patient can tolerate solid protein.   The patient is eating protein first. The patient is not limiting food volume. The patient is taking vitamins. The patient is limiting snacking. The patient is not exercising regularly. She is drinking carbonated beverages.     The following portions of the patient's history were reviewed and updated as appropriate: allergies, current medications, past family history, past medical history, past social history, past surgical history, and problem list.    Vitals:    12/12/24 1440   BP: 150/78   Pulse: 69   Temp: 97.5 °F (36.4 °C)       Review of Systems   Constitutional:  Positive for unexpected weight change.   Endocrine: Positive for polyphagia.   All other systems reviewed and are negative.    Physical Exam  Vitals reviewed.    Constitutional:       Appearance: She is well-developed.   HENT:      Head: Normocephalic and atraumatic.   Cardiovascular:      Rate and Rhythm: Normal rate.   Pulmonary:      Effort: Pulmonary effort is normal.   Abdominal:      Palpations: Abdomen is soft.   Musculoskeletal:         General: Normal range of motion.   Skin:     General: Skin is warm and dry.   Neurological:      Mental Status: She is alert and oriented to person, place, and time.   Psychiatric:         Behavior: Behavior normal.         Thought Content: Thought content normal.         Judgment: Judgment normal.     Assessment: Post-operatively the patient would benefit from additional restriction    Encounter Diagnoses   Name Primary?    Obesity, Class II, BMI 35-39.9 Yes    Polyphagia(783.6)     H/O laparoscopic adjustable gastric banding     Dietary counseling        Plan:     I think she would benefit from additional band restriction.  Under aseptic conditions, I accessed the port and 0.5mls of fluid were added for a total of 6.1mls.  She was able to tolerate a glass of water at the conclusion of the fill.  She was advised to consume soft solids for 24 hours before advancing back to a regular diet.  RTC for n/v/d/regurg.     We discussed her protein sources and that eating dense solid protein along with plenty of vegetables and fresh fruits is important for weight loss. Reviewed appropriate water intake- half of body weight in ounces and exercise routine- minimum of 150 minutes per with including both cardio and strength training. Instructed not to drink with meals and wait 45 minutes after each meal before drinking.    She should follow-up in 4-6 weeks     Activity restrictions: None.   Instructions / Recommendations: dietary counseling recommended, recommended a daily protein intake of  grams, patient was advised that the lap band system works best when consuming solid foods, vitamin supplement(s) recommended, recommended exercising  at least 150 minutes per week, behavior modifications recommended and instructed to call the office for concerns, questions, or problems.    Total time spent with the patient was 30 minutes.  Chart was also reviewed prior to starting the visit. The patient was counseled regarding the LAP-BAND and how the band works.

## 2025-01-18 ENCOUNTER — HOSPITAL ENCOUNTER (OUTPATIENT)
Dept: MAMMOGRAPHY | Facility: HOSPITAL | Age: 45
Discharge: HOME | End: 2025-01-18
Admitting: NURSE PRACTITIONER
Payer: COMMERCIAL

## 2025-01-18 DIAGNOSIS — Z30.09 SCREENING AND EVALUATION FOR FEMALE STERILIZATION: ICD-10-CM

## 2025-01-18 PROCEDURE — 77067 SCR MAMMO BI INCL CAD: CPT

## 2025-01-18 PROCEDURE — 77063 BREAST TOMOSYNTHESIS BI: CPT

## 2025-01-23 ENCOUNTER — OFFICE VISIT (OUTPATIENT)
Dept: BARIATRICS/WEIGHT MGMT | Facility: CLINIC | Age: 45
End: 2025-01-23
Payer: COMMERCIAL

## 2025-01-23 VITALS
HEART RATE: 74 BPM | SYSTOLIC BLOOD PRESSURE: 153 MMHG | DIASTOLIC BLOOD PRESSURE: 84 MMHG | TEMPERATURE: 98.2 F | BODY MASS INDEX: 36.41 KG/M2 | HEIGHT: 67 IN | WEIGHT: 232 LBS

## 2025-01-23 DIAGNOSIS — E66.812 OBESITY, CLASS II, BMI 35-39.9: Primary | ICD-10-CM

## 2025-01-23 DIAGNOSIS — Z98.84 H/O LAPAROSCOPIC ADJUSTABLE GASTRIC BANDING: ICD-10-CM

## 2025-01-23 DIAGNOSIS — R63.2 POLYPHAGIA(783.6): ICD-10-CM

## 2025-01-23 DIAGNOSIS — Z71.3 DIETARY COUNSELING: ICD-10-CM

## 2025-01-23 PROCEDURE — 99214 OFFICE O/P EST MOD 30 MIN: CPT | Performed by: NURSE PRACTITIONER

## 2025-01-23 NOTE — PROGRESS NOTES
[facility] BARIATRIC SURGERY  [address]  [facility] [address] [phone]  [address]  [facility] [address] [phone]  Dept: [phone]  1/23/2025      Rosa Avila.  [id number]  [id number]  1980  female      Chief Complaint   Patient presents with    Follow-up     Fup band       BH Post-Op Bariatric Surgery:   Rosa Avila is status post Lapband procedure APS, performed on 12/17/10.     HPI:   Today's weight is 105 kg (232 lb)  pounds, today's BMI is Body mass index is 36.33 kg/m². and has a loss of 3 pounds since the last visit. The patient reports a portion size larger than the small plate, increased hunger and denies a loss of appetite.     Rosa Avila denies nausea, dysphagia, or abdominal pain. The patientdoes not have vomitng. The patientdoes not have reflux.    Diet and Exercise: Diet history reviewed and discussed with the patient. Weight loss/gains to date discussed with the patient. She reports eating 3 meals per day, a typical portion size of greater than 1 cup, eating 2 snack per day, drinking 2 8-oz. glasses of water per day. The patient can tolerate solid protein.   The patient is eating protein first. The patient is not limiting food volume. The patient is not taking vitamins. The patient is limiting snacking. The patient is exercising regularly. She is not drinking carbonated beverages.     The following portions of the patient's history were reviewed and updated as appropriate: allergies, current medications, past family history, past medical history, past social history, past surgical history, and problem list.    Vitals:    01/23/25 1328   BP: 153/84   Pulse: 74   Temp: 98.2 °F (36.8 °C)       Review of Systems   Endocrine: Positive for polyphagia.   All other systems reviewed and are negative.      Physical Exam  Vitals reviewed.   Constitutional:       Appearance: Normal appearance. She is  well-developed. She is obese.   HENT:      Head: Normocephalic and atraumatic.   Cardiovascular:      Rate and Rhythm: Normal rate.   Pulmonary:      Effort: Pulmonary effort is normal.   Abdominal:      Palpations: Abdomen is soft.   Musculoskeletal:         General: Normal range of motion.   Skin:     General: Skin is warm and dry.   Neurological:      Mental Status: She is alert and oriented to person, place, and time.   Psychiatric:         Behavior: Behavior normal.         Thought Content: Thought content normal.         Judgment: Judgment normal.         Assessment: Post-operatively the patient would benefit from additional restriction    Encounter Diagnoses   Name Primary?    Obesity, Class II, BMI 35-39.9 Yes    H/O laparoscopic adjustable gastric banding     Dietary counseling     Polyphagia(783.6)        Plan:     I think she would benefit from additional band restriction.  Under aseptic conditions, I accessed the port and 0.4mls of fluid were added for a total of 6.5mls.  She was able to tolerate a glass of water at the conclusion of the fill.  She was advised to consume soft solids for 24 hours before advancing back to a regular diet.  RTC for n/v/d/regurg.     We discussed her protein sources and that eating dense solid protein along with plenty of vegetables and fresh fruits is important for weight loss. Reviewed appropriate water intake- half of body weight in ounces and exercise routine- minimum of 150 minutes per with including both cardio and strength training. Instructed not to drink with meals and wait 45 minutes after each meal before drinking.    She should follow-up in 4 weeks     Activity restrictions: None.   Instructions / Recommendations: dietary counseling recommended, recommended a daily protein intake of  grams, patient was advised that the lap band system works best when consuming solid foods, vitamin supplement(s) recommended, recommended exercising at least 150 minutes per  week, behavior modifications recommended and instructed to call the office for concerns, questions, or problems.    Total time spent on this encounter was 30 minutes.  Chart was also reviewed prior to starting the visit. The patient was counseled regarding the LAP-BAND and how the band works.

## 2025-01-24 ENCOUNTER — OFFICE VISIT (OUTPATIENT)
Dept: BARIATRICS/WEIGHT MGMT | Facility: CLINIC | Age: 45
End: 2025-01-24
Payer: COMMERCIAL

## 2025-01-24 VITALS
TEMPERATURE: 97.6 F | BODY MASS INDEX: 35.31 KG/M2 | HEIGHT: 67 IN | HEART RATE: 80 BPM | DIASTOLIC BLOOD PRESSURE: 77 MMHG | WEIGHT: 225 LBS | SYSTOLIC BLOOD PRESSURE: 140 MMHG

## 2025-01-24 DIAGNOSIS — Z98.84 H/O LAPAROSCOPIC ADJUSTABLE GASTRIC BANDING: ICD-10-CM

## 2025-01-24 DIAGNOSIS — E66.812 OBESITY, CLASS II, BMI 35-39.9: Primary | ICD-10-CM

## 2025-01-24 NOTE — PROGRESS NOTES
[facility] BARIATRIC SURGERY  [address]  [facility] [address]  [phone]  [address]  [facility] [address] [phone]  Dept: [phone]  1/24/2025      Rosa Avila.  [id number]  [id number]  1980  female      Chief Complaint   Patient presents with    Follow-up     Fup band       BH Post-Op Bariatric Surgery:   oRsa Avila is status post Lapband procedure APS, performed on 12/17/2010.     HPI:   Today's weight is 102 kg (225 lb)  pounds, today's BMI is Body mass index is 35.23 kg/m². and has a loss of 7 pounds since the last visit. The patient reports decreased hunger and  loss of appetite.     Patient admits to nausea and dysphagia. The patient denies abdominal pain. The patientdoes have vomitng. The patientdoes not have reflux.  Since fill yesterday, she has been unable to tolerate clear liquids.  Has tried to drink warm coffee but did not improve.  Could not sleep last night due to vomiting.      The following portions of the patient's history were reviewed and updated as appropriate: allergies, current medications, past family history, past social history, and problem list.    Vitals:    01/24/25 1329   BP: 140/77   Pulse: 80   Temp: 97.6 °F (36.4 °C)       Review of Systems   Constitutional:  Positive for appetite change.   Respiratory:  Negative for shortness of breath.    Cardiovascular:  Negative for chest pain.   Gastrointestinal:  Positive for nausea and vomiting.   All other systems reviewed and are negative.      Physical Exam  Vitals reviewed.   Constitutional:       General: She is not in acute distress.     Appearance: Normal appearance. She is obese.   HENT:      Head: Normocephalic and atraumatic.      Mouth/Throat:      Mouth: Mucous membranes are moist.      Pharynx: Oropharynx is clear.   Eyes:      General: No scleral icterus.     Extraocular Movements: Extraocular movements intact.       Conjunctiva/sclera: Conjunctivae normal.      Pupils: Pupils are equal, round, and reactive to light.   Cardiovascular:      Rate and Rhythm: Normal rate and regular rhythm.   Pulmonary:      Effort: Pulmonary effort is normal. No respiratory distress.   Abdominal:      General: Bowel sounds are normal.      Palpations: Abdomen is soft.   Musculoskeletal:         General: Normal range of motion.      Cervical back: Normal range of motion and neck supple.   Skin:     General: Skin is warm and dry.   Neurological:      General: No focal deficit present.      Mental Status: She is alert and oriented to person, place, and time.   Psychiatric:         Mood and Affect: Mood normal.         Behavior: Behavior normal.         Thought Content: Thought content normal.         Judgment: Judgment normal.         Assessment: Post-operatively the patient is struggling with inability to tolerate liquids and vomiting    Encounter Diagnoses   Name Primary?    Obesity, Class II, BMI 35-39.9 Yes    H/O laparoscopic adjustable gastric banding        Plan:    My impression is Rosa Avila has too much restriction of her band.  I think she would benefit from fluid removal from her band.  Under aseptic conditions, I accessed the port and removed 0.2cc to bring the total band volume to 6.3 cc.  She was able to tolerate a glass of water at the conclusion of the fill.  She was advised to consume warm liquids for today and then soft solids for 24 hours before advancing back to a regular diet.   RTC for n/v/d/regurg.     Reviewed the importance of being able to tolerate dense/solid protein and vegetables for weight loss. Discussed eating foods that are easier to tolerate, softer foods, usually contribute to weight gain because they are higher calorie/carb and will not keep patient full for the recommended 3-4 hours.      She should follow-up as scheduled.      Activity restrictions: None.   Instructions / Recommendations: dietary counseling  recommended, recommended a daily protein intake of  grams, patient was advised that the lap band system works best when consuming solid foods, vitamin supplement(s) recommended, recommended exercising at least 150 minutes per week, behavior modifications recommended and instructed to call the office for concerns, questions, or problems.     Total time adjusting the band and time with the patient was approximately 20 minutes.  Chart was also reviewed prior to starting the visit. The patient was counseled regarding the LAP-BAND and how the band works.